# Patient Record
Sex: FEMALE | Race: BLACK OR AFRICAN AMERICAN | NOT HISPANIC OR LATINO | URBAN - METROPOLITAN AREA
[De-identification: names, ages, dates, MRNs, and addresses within clinical notes are randomized per-mention and may not be internally consistent; named-entity substitution may affect disease eponyms.]

---

## 2017-02-27 ENCOUNTER — OUTPATIENT (OUTPATIENT)
Dept: OUTPATIENT SERVICES | Facility: HOSPITAL | Age: 62
LOS: 1 days | Discharge: HOME | End: 2017-02-27

## 2017-02-27 ENCOUNTER — RESULT REVIEW (OUTPATIENT)
Age: 62
End: 2017-02-27

## 2017-02-27 DIAGNOSIS — D64.9 ANEMIA, UNSPECIFIED: ICD-10-CM

## 2017-02-28 ENCOUNTER — APPOINTMENT (OUTPATIENT)
Dept: HEMATOLOGY ONCOLOGY | Facility: CLINIC | Age: 62
End: 2017-02-28

## 2017-02-28 VITALS
SYSTOLIC BLOOD PRESSURE: 105 MMHG | DIASTOLIC BLOOD PRESSURE: 71 MMHG | HEART RATE: 76 BPM | OXYGEN SATURATION: 100 % | WEIGHT: 189.15 LBS | TEMPERATURE: 97.1 F | RESPIRATION RATE: 16 BRPM | BODY MASS INDEX: 34.04 KG/M2

## 2017-02-28 LAB
BASOPHILS # BLD AUTO: 0 K/UL — SIGNIFICANT CHANGE UP (ref 0–0.2)
BASOPHILS NFR BLD AUTO: 0.5 % — SIGNIFICANT CHANGE UP (ref 0–2)
EOSINOPHIL # BLD AUTO: 0.1 K/UL — SIGNIFICANT CHANGE UP (ref 0–0.5)
EOSINOPHIL NFR BLD AUTO: 1.8 % — SIGNIFICANT CHANGE UP (ref 0–6)
HCT VFR BLD CALC: 37.6 % — SIGNIFICANT CHANGE UP (ref 34.5–45)
HGB BLD-MCNC: 13 G/DL — SIGNIFICANT CHANGE UP (ref 11.5–15.5)
LYMPHOCYTES # BLD AUTO: 1.6 K/UL — SIGNIFICANT CHANGE UP (ref 1–3.3)
LYMPHOCYTES # BLD AUTO: 26.1 % — SIGNIFICANT CHANGE UP (ref 13–44)
MCHC RBC-ENTMCNC: 28.6 PG — SIGNIFICANT CHANGE UP (ref 27–34)
MCHC RBC-ENTMCNC: 34.6 G/DL — SIGNIFICANT CHANGE UP (ref 32–36)
MCV RBC AUTO: 82.5 FL — SIGNIFICANT CHANGE UP (ref 80–100)
MONOCYTES # BLD AUTO: 0.6 K/UL — SIGNIFICANT CHANGE UP (ref 0–0.9)
MONOCYTES NFR BLD AUTO: 9.7 % — SIGNIFICANT CHANGE UP (ref 2–14)
NEUTROPHILS # BLD AUTO: 3.9 K/UL — SIGNIFICANT CHANGE UP (ref 1.8–7.4)
NEUTROPHILS NFR BLD AUTO: 62 % — SIGNIFICANT CHANGE UP (ref 43–77)
PLATELET # BLD AUTO: 147 K/UL — LOW (ref 150–400)
RBC # BLD: 4.55 M/UL — SIGNIFICANT CHANGE UP (ref 3.8–5.2)
RBC # FLD: 13.3 % — SIGNIFICANT CHANGE UP (ref 10.3–14.5)
WBC # BLD: 6.2 K/UL — SIGNIFICANT CHANGE UP (ref 3.8–10.5)
WBC # FLD AUTO: 6.2 K/UL — SIGNIFICANT CHANGE UP (ref 3.8–10.5)

## 2017-04-28 ENCOUNTER — RESULT REVIEW (OUTPATIENT)
Age: 62
End: 2017-04-28

## 2017-05-31 ENCOUNTER — RESULT REVIEW (OUTPATIENT)
Age: 62
End: 2017-05-31

## 2017-06-05 ENCOUNTER — APPOINTMENT (OUTPATIENT)
Dept: ALLERGY | Facility: CLINIC | Age: 62
End: 2017-06-05

## 2017-06-05 VITALS
WEIGHT: 180 LBS | DIASTOLIC BLOOD PRESSURE: 80 MMHG | HEART RATE: 80 BPM | RESPIRATION RATE: 16 BRPM | SYSTOLIC BLOOD PRESSURE: 140 MMHG | BODY MASS INDEX: 31.89 KG/M2 | HEIGHT: 63 IN

## 2017-06-05 RX ORDER — METAXALONE 800 MG/1
800 TABLET ORAL
Qty: 21 | Refills: 0 | Status: DISCONTINUED | COMMUNITY
Start: 2017-01-26

## 2017-06-05 RX ORDER — GABAPENTIN 100 MG/1
100 CAPSULE ORAL
Qty: 180 | Refills: 0 | Status: DISCONTINUED | COMMUNITY
Start: 2016-12-28

## 2017-06-05 RX ORDER — CIPROFLOXACIN HYDROCHLORIDE 500 MG/1
500 TABLET, FILM COATED ORAL
Qty: 20 | Refills: 0 | Status: DISCONTINUED | COMMUNITY
Start: 2016-11-02

## 2017-06-07 ENCOUNTER — APPOINTMENT (OUTPATIENT)
Dept: ALLERGY | Facility: CLINIC | Age: 62
End: 2017-06-07

## 2017-06-15 ENCOUNTER — APPOINTMENT (OUTPATIENT)
Dept: ALLERGY | Facility: CLINIC | Age: 62
End: 2017-06-15

## 2017-06-15 VITALS
WEIGHT: 180 LBS | DIASTOLIC BLOOD PRESSURE: 80 MMHG | HEART RATE: 80 BPM | SYSTOLIC BLOOD PRESSURE: 120 MMHG | HEIGHT: 63 IN | BODY MASS INDEX: 31.89 KG/M2 | RESPIRATION RATE: 16 BRPM

## 2017-08-18 ENCOUNTER — OUTPATIENT (OUTPATIENT)
Dept: OUTPATIENT SERVICES | Facility: HOSPITAL | Age: 62
LOS: 1 days | Discharge: HOME | End: 2017-08-18

## 2017-08-18 DIAGNOSIS — D64.9 ANEMIA, UNSPECIFIED: ICD-10-CM

## 2017-08-23 ENCOUNTER — APPOINTMENT (OUTPATIENT)
Dept: HEMATOLOGY ONCOLOGY | Facility: CLINIC | Age: 62
End: 2017-08-23
Payer: COMMERCIAL

## 2017-08-23 ENCOUNTER — RESULT REVIEW (OUTPATIENT)
Age: 62
End: 2017-08-23

## 2017-08-23 VITALS
WEIGHT: 189.58 LBS | OXYGEN SATURATION: 99 % | SYSTOLIC BLOOD PRESSURE: 128 MMHG | BODY MASS INDEX: 33.58 KG/M2 | RESPIRATION RATE: 16 BRPM | TEMPERATURE: 98.7 F | DIASTOLIC BLOOD PRESSURE: 84 MMHG | HEART RATE: 73 BPM

## 2017-08-23 LAB
BASOPHILS # BLD AUTO: 0 K/UL — SIGNIFICANT CHANGE UP (ref 0–0.2)
BASOPHILS NFR BLD AUTO: 0.1 % — SIGNIFICANT CHANGE UP (ref 0–2)
EOSINOPHIL # BLD AUTO: 0.2 K/UL — SIGNIFICANT CHANGE UP (ref 0–0.5)
EOSINOPHIL NFR BLD AUTO: 4.8 % — SIGNIFICANT CHANGE UP (ref 0–6)
HCT VFR BLD CALC: 35.8 % — SIGNIFICANT CHANGE UP (ref 34.5–45)
HGB BLD-MCNC: 12.6 G/DL — SIGNIFICANT CHANGE UP (ref 11.5–15.5)
LYMPHOCYTES # BLD AUTO: 1.6 K/UL — SIGNIFICANT CHANGE UP (ref 1–3.3)
LYMPHOCYTES # BLD AUTO: 36.3 % — SIGNIFICANT CHANGE UP (ref 13–44)
MCHC RBC-ENTMCNC: 28.9 PG — SIGNIFICANT CHANGE UP (ref 27–34)
MCHC RBC-ENTMCNC: 35.2 G/DL — SIGNIFICANT CHANGE UP (ref 32–36)
MCV RBC AUTO: 82.1 FL — SIGNIFICANT CHANGE UP (ref 80–100)
MONOCYTES # BLD AUTO: 0.5 K/UL — SIGNIFICANT CHANGE UP (ref 0–0.9)
MONOCYTES NFR BLD AUTO: 10.7 % — SIGNIFICANT CHANGE UP (ref 2–14)
NEUTROPHILS # BLD AUTO: 2.1 K/UL — SIGNIFICANT CHANGE UP (ref 1.8–7.4)
NEUTROPHILS NFR BLD AUTO: 48.1 % — SIGNIFICANT CHANGE UP (ref 43–77)
PLATELET # BLD AUTO: 136 K/UL — LOW (ref 150–400)
RBC # BLD: 4.36 M/UL — SIGNIFICANT CHANGE UP (ref 3.8–5.2)
RBC # FLD: 13.1 % — SIGNIFICANT CHANGE UP (ref 10.3–14.5)
WBC # BLD: 4.4 K/UL — SIGNIFICANT CHANGE UP (ref 3.8–10.5)
WBC # FLD AUTO: 4.4 K/UL — SIGNIFICANT CHANGE UP (ref 3.8–10.5)

## 2017-08-23 PROCEDURE — 99213 OFFICE O/P EST LOW 20 MIN: CPT

## 2017-08-24 LAB
ALBUMIN SERPL ELPH-MCNC: 3.9 G/DL
ALP BLD-CCNC: 44 U/L
ALT SERPL-CCNC: 11 U/L
ANION GAP SERPL CALC-SCNC: 13 MMOL/L
AST SERPL-CCNC: 17 U/L
BILIRUB SERPL-MCNC: 0.4 MG/DL
BUN SERPL-MCNC: 9 MG/DL
CALCIUM SERPL-MCNC: 8.9 MG/DL
CANCER AG27-29 SERPL-ACNC: 21.2 U/ML
CHLORIDE SERPL-SCNC: 106 MMOL/L
CO2 SERPL-SCNC: 24 MMOL/L
CREAT SERPL-MCNC: 0.78 MG/DL
GLUCOSE SERPL-MCNC: 89 MG/DL
LDH SERPL-CCNC: 196 U/L
POTASSIUM SERPL-SCNC: 3.8 MMOL/L
PROT SERPL-MCNC: 7.5 G/DL
SODIUM SERPL-SCNC: 143 MMOL/L

## 2017-11-27 ENCOUNTER — APPOINTMENT (OUTPATIENT)
Dept: CT IMAGING | Facility: IMAGING CENTER | Age: 62
End: 2017-11-27
Payer: COMMERCIAL

## 2017-11-27 ENCOUNTER — OUTPATIENT (OUTPATIENT)
Dept: OUTPATIENT SERVICES | Facility: HOSPITAL | Age: 62
LOS: 1 days | End: 2017-11-27
Payer: COMMERCIAL

## 2017-11-27 DIAGNOSIS — Z00.8 ENCOUNTER FOR OTHER GENERAL EXAMINATION: ICD-10-CM

## 2017-11-27 PROCEDURE — 70486 CT MAXILLOFACIAL W/O DYE: CPT

## 2017-11-27 PROCEDURE — 70486 CT MAXILLOFACIAL W/O DYE: CPT | Mod: 26

## 2018-02-21 ENCOUNTER — OUTPATIENT (OUTPATIENT)
Dept: OUTPATIENT SERVICES | Facility: HOSPITAL | Age: 63
LOS: 1 days | Discharge: HOME | End: 2018-02-21

## 2018-02-21 DIAGNOSIS — D64.9 ANEMIA, UNSPECIFIED: ICD-10-CM

## 2018-02-27 ENCOUNTER — APPOINTMENT (OUTPATIENT)
Dept: HEMATOLOGY ONCOLOGY | Facility: CLINIC | Age: 63
End: 2018-02-27

## 2018-03-28 ENCOUNTER — APPOINTMENT (OUTPATIENT)
Dept: ALLERGY | Facility: CLINIC | Age: 63
End: 2018-03-28
Payer: COMMERCIAL

## 2018-03-28 VITALS
RESPIRATION RATE: 14 BRPM | HEIGHT: 63 IN | SYSTOLIC BLOOD PRESSURE: 130 MMHG | WEIGHT: 180 LBS | HEART RATE: 72 BPM | BODY MASS INDEX: 31.89 KG/M2 | DIASTOLIC BLOOD PRESSURE: 80 MMHG

## 2018-03-28 PROCEDURE — 99214 OFFICE O/P EST MOD 30 MIN: CPT

## 2018-03-28 RX ORDER — PREDNISONE 20 MG/1
20 TABLET ORAL
Qty: 6 | Refills: 0 | Status: DISCONTINUED | COMMUNITY
Start: 2018-03-05

## 2018-03-28 RX ORDER — PREDNISONE 10 MG/1
10 TABLET ORAL
Qty: 15 | Refills: 0 | Status: DISCONTINUED | COMMUNITY
Start: 2017-06-05 | End: 2018-03-28

## 2018-03-28 RX ORDER — BENZONATATE 200 MG/1
200 CAPSULE ORAL
Qty: 30 | Refills: 0 | Status: DISCONTINUED | COMMUNITY
Start: 2018-03-05

## 2018-04-12 ENCOUNTER — OUTPATIENT (OUTPATIENT)
Dept: OUTPATIENT SERVICES | Facility: HOSPITAL | Age: 63
LOS: 1 days | Discharge: HOME | End: 2018-04-12

## 2018-04-12 DIAGNOSIS — D64.9 ANEMIA, UNSPECIFIED: ICD-10-CM

## 2018-04-17 ENCOUNTER — LABORATORY RESULT (OUTPATIENT)
Age: 63
End: 2018-04-17

## 2018-04-17 ENCOUNTER — RESULT REVIEW (OUTPATIENT)
Age: 63
End: 2018-04-17

## 2018-04-17 ENCOUNTER — APPOINTMENT (OUTPATIENT)
Dept: HEMATOLOGY ONCOLOGY | Facility: CLINIC | Age: 63
End: 2018-04-17
Payer: COMMERCIAL

## 2018-04-17 VITALS
TEMPERATURE: 98.4 F | RESPIRATION RATE: 16 BRPM | SYSTOLIC BLOOD PRESSURE: 138 MMHG | HEART RATE: 97 BPM | WEIGHT: 195.11 LBS | BODY MASS INDEX: 34.56 KG/M2 | DIASTOLIC BLOOD PRESSURE: 87 MMHG | OXYGEN SATURATION: 98 %

## 2018-04-17 DIAGNOSIS — R25.2 CRAMP AND SPASM: ICD-10-CM

## 2018-04-17 LAB
BASOPHILS # BLD AUTO: 0 K/UL — SIGNIFICANT CHANGE UP (ref 0–0.2)
BASOPHILS NFR BLD AUTO: 0.8 % — SIGNIFICANT CHANGE UP (ref 0–2)
EOSINOPHIL # BLD AUTO: 0.1 K/UL — SIGNIFICANT CHANGE UP (ref 0–0.5)
EOSINOPHIL NFR BLD AUTO: 2.4 % — SIGNIFICANT CHANGE UP (ref 0–6)
HCT VFR BLD CALC: 37.6 % — SIGNIFICANT CHANGE UP (ref 34.5–45)
HGB BLD-MCNC: 13.1 G/DL — SIGNIFICANT CHANGE UP (ref 11.5–15.5)
LYMPHOCYTES # BLD AUTO: 1.6 K/UL — SIGNIFICANT CHANGE UP (ref 1–3.3)
LYMPHOCYTES # BLD AUTO: 26.9 % — SIGNIFICANT CHANGE UP (ref 13–44)
MCHC RBC-ENTMCNC: 28.7 PG — SIGNIFICANT CHANGE UP (ref 27–34)
MCHC RBC-ENTMCNC: 34.8 G/DL — SIGNIFICANT CHANGE UP (ref 32–36)
MCV RBC AUTO: 82.6 FL — SIGNIFICANT CHANGE UP (ref 80–100)
MONOCYTES # BLD AUTO: 0.5 K/UL — SIGNIFICANT CHANGE UP (ref 0–0.9)
MONOCYTES NFR BLD AUTO: 9.4 % — SIGNIFICANT CHANGE UP (ref 2–14)
NEUTROPHILS # BLD AUTO: 3.5 K/UL — SIGNIFICANT CHANGE UP (ref 1.8–7.4)
NEUTROPHILS NFR BLD AUTO: 60.5 % — SIGNIFICANT CHANGE UP (ref 43–77)
PLATELET # BLD AUTO: 139 K/UL — LOW (ref 150–400)
RBC # BLD: 4.56 M/UL — SIGNIFICANT CHANGE UP (ref 3.8–5.2)
RBC # FLD: 13.6 % — SIGNIFICANT CHANGE UP (ref 10.3–14.5)
WBC # BLD: 5.8 K/UL — SIGNIFICANT CHANGE UP (ref 3.8–10.5)
WBC # FLD AUTO: 5.8 K/UL — SIGNIFICANT CHANGE UP (ref 3.8–10.5)

## 2018-04-17 PROCEDURE — 99214 OFFICE O/P EST MOD 30 MIN: CPT

## 2019-02-26 ENCOUNTER — OUTPATIENT (OUTPATIENT)
Dept: OUTPATIENT SERVICES | Facility: HOSPITAL | Age: 64
LOS: 1 days | Discharge: HOME | End: 2019-02-26

## 2019-02-26 DIAGNOSIS — D64.9 ANEMIA, UNSPECIFIED: ICD-10-CM

## 2019-03-07 ENCOUNTER — APPOINTMENT (OUTPATIENT)
Dept: HEMATOLOGY ONCOLOGY | Facility: CLINIC | Age: 64
End: 2019-03-07
Payer: COMMERCIAL

## 2019-03-14 ENCOUNTER — APPOINTMENT (OUTPATIENT)
Dept: HEMATOLOGY ONCOLOGY | Facility: CLINIC | Age: 64
End: 2019-03-14
Payer: COMMERCIAL

## 2019-03-14 ENCOUNTER — RESULT REVIEW (OUTPATIENT)
Age: 64
End: 2019-03-14

## 2019-03-14 VITALS
RESPIRATION RATE: 18 BRPM | TEMPERATURE: 98.8 F | OXYGEN SATURATION: 100 % | BODY MASS INDEX: 35.54 KG/M2 | WEIGHT: 200.62 LBS | SYSTOLIC BLOOD PRESSURE: 123 MMHG | DIASTOLIC BLOOD PRESSURE: 80 MMHG | HEART RATE: 69 BPM

## 2019-03-14 DIAGNOSIS — Z63.0 PROBLEMS IN RELATIONSHIP WITH SPOUSE OR PARTNER: ICD-10-CM

## 2019-03-14 LAB
BASOPHILS # BLD AUTO: 0 K/UL — SIGNIFICANT CHANGE UP (ref 0–0.2)
BASOPHILS NFR BLD AUTO: 0.7 % — SIGNIFICANT CHANGE UP (ref 0–2)
EOSINOPHIL # BLD AUTO: 0.2 K/UL — SIGNIFICANT CHANGE UP (ref 0–0.5)
EOSINOPHIL NFR BLD AUTO: 3.6 % — SIGNIFICANT CHANGE UP (ref 0–6)
HCT VFR BLD CALC: 35.3 % — SIGNIFICANT CHANGE UP (ref 34.5–45)
HGB BLD-MCNC: 12.6 G/DL — SIGNIFICANT CHANGE UP (ref 11.5–15.5)
LYMPHOCYTES # BLD AUTO: 1.5 K/UL — SIGNIFICANT CHANGE UP (ref 1–3.3)
LYMPHOCYTES # BLD AUTO: 31.1 % — SIGNIFICANT CHANGE UP (ref 13–44)
MCHC RBC-ENTMCNC: 28.7 PG — SIGNIFICANT CHANGE UP (ref 27–34)
MCHC RBC-ENTMCNC: 35.8 G/DL — SIGNIFICANT CHANGE UP (ref 32–36)
MCV RBC AUTO: 80.1 FL — SIGNIFICANT CHANGE UP (ref 80–100)
MONOCYTES # BLD AUTO: 0.4 K/UL — SIGNIFICANT CHANGE UP (ref 0–0.9)
MONOCYTES NFR BLD AUTO: 8.9 % — SIGNIFICANT CHANGE UP (ref 2–14)
NEUTROPHILS # BLD AUTO: 2.6 K/UL — SIGNIFICANT CHANGE UP (ref 1.8–7.4)
NEUTROPHILS NFR BLD AUTO: 55.7 % — SIGNIFICANT CHANGE UP (ref 43–77)
PLATELET # BLD AUTO: 161 K/UL — SIGNIFICANT CHANGE UP (ref 150–400)
RBC # BLD: 4.41 M/UL — SIGNIFICANT CHANGE UP (ref 3.8–5.2)
RBC # FLD: 13 % — SIGNIFICANT CHANGE UP (ref 10.3–14.5)
WBC # BLD: 4.7 K/UL — SIGNIFICANT CHANGE UP (ref 3.8–10.5)
WBC # FLD AUTO: 4.7 K/UL — SIGNIFICANT CHANGE UP (ref 3.8–10.5)

## 2019-03-14 PROCEDURE — 99215 OFFICE O/P EST HI 40 MIN: CPT

## 2019-03-14 SDOH — SOCIAL STABILITY - SOCIAL INSECURITY: PROBLEMS IN RELATIONSHIP WITH SPOUSE OR PARTNER: Z63.0
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
## 2019-03-14 NOTE — ASSESSMENT
[FreeTextEntry1] : NO further breast events\par \par \par Plan referral Akash Doshi for headaches\par Recommended therapy

## 2019-03-15 LAB
ALBUMIN SERPL ELPH-MCNC: 4.2 G/DL
ALP BLD-CCNC: 59 U/L
ALT SERPL-CCNC: 51 U/L
ANION GAP SERPL CALC-SCNC: 10 MMOL/L
AST SERPL-CCNC: 40 U/L
BILIRUB SERPL-MCNC: 0.4 MG/DL
BUN SERPL-MCNC: 8 MG/DL
CALCIUM SERPL-MCNC: 9.5 MG/DL
CANCER AG27-29 SERPL-ACNC: 27.3 U/ML
CHLORIDE SERPL-SCNC: 106 MMOL/L
CO2 SERPL-SCNC: 26 MMOL/L
CREAT SERPL-MCNC: 0.79 MG/DL
GLUCOSE SERPL-MCNC: 99 MG/DL
LDH SERPL-CCNC: 206 U/L
POTASSIUM SERPL-SCNC: 3.9 MMOL/L
PROT SERPL-MCNC: 7.3 G/DL
SODIUM SERPL-SCNC: 142 MMOL/L

## 2019-03-25 ENCOUNTER — RESULT REVIEW (OUTPATIENT)
Age: 64
End: 2019-03-25

## 2019-05-21 ENCOUNTER — OTHER (OUTPATIENT)
Age: 64
End: 2019-05-21

## 2019-08-02 ENCOUNTER — APPOINTMENT (OUTPATIENT)
Dept: GASTROENTEROLOGY | Facility: CLINIC | Age: 64
End: 2019-08-02
Payer: COMMERCIAL

## 2019-08-02 VITALS
SYSTOLIC BLOOD PRESSURE: 132 MMHG | RESPIRATION RATE: 16 BRPM | BODY MASS INDEX: 32.78 KG/M2 | TEMPERATURE: 97.6 F | DIASTOLIC BLOOD PRESSURE: 72 MMHG | WEIGHT: 185 LBS | HEART RATE: 88 BPM | HEIGHT: 63 IN | OXYGEN SATURATION: 98 %

## 2019-08-02 DIAGNOSIS — Z12.11 ENCOUNTER FOR SCREENING FOR MALIGNANT NEOPLASM OF COLON: ICD-10-CM

## 2019-08-02 PROCEDURE — 99204 OFFICE O/P NEW MOD 45 MIN: CPT

## 2019-08-02 RX ORDER — POLYETHYLENE GLYCOL 3350, SODIUM SULFATE, SODIUM CHLORIDE, POTASSIUM CHLORIDE, ASCORBIC ACID, SODIUM ASCORBATE 7.5-2.691G
100 KIT ORAL
Qty: 1 | Refills: 0 | Status: DISCONTINUED | COMMUNITY
Start: 2019-05-21 | End: 2019-08-02

## 2019-08-02 RX ORDER — FLUTICASONE PROPIONATE AND SALMETEROL 50; 500 UG/1; UG/1
500-50 POWDER RESPIRATORY (INHALATION) TWICE DAILY
Qty: 1 | Refills: 1 | Status: DISCONTINUED | COMMUNITY
Start: 2017-06-05 | End: 2019-08-02

## 2019-08-02 NOTE — HISTORY OF PRESENT ILLNESS
[FreeTextEntry1] : Office consultation 8/2/19.\par \par The patient is a 63-year-old woman evaluated for consultation in preparation for a surveillance colonoscopy. PMD: Dr. Joie Marie.\par \par The patient has one to 2 formed bowel movements daily without any complaints of diarrhea, constipation, change in bowel habit or rectal bleeding.\par \par The patient has a long history of "indigestion" of many years duration. This has been episodic over the years. More recently, this has been so less trouble than in the past. Complaint of indigestion is described as heartburn, upper abdominal bloating, gaseous feeling and feeling of need to belch. Gets relief with Pepcid. Although describes feeling full easily she is able to have a regular full-sized meal. These episodes of indigestion can occur approximately one to 2 times per month. Episodically over the years she has been on various antisecretory therapies. For the past 6 months she has been on pantoprazole 40 mg q.d. with symptomatic improvement.\par \par Appetite is stable. Patient may have lost several pounds but this was intentional. No significant dysphagia. Occasionally can feel sense of retrosternal bolus hangup. Denies nausea or vomiting. As noted, can experience heartburn with the indigestion episodes.\par \par  The patient underwent a surveillance colonoscopy on 8/18/14. Total colonoscopy was performed to the cecum with ileoscopy. A 3 millimeter ascending colon adenoma, a 3 mm proximal transverse colon adenoma and a 1 mm rectal hyperplastic polyp were removed. Anal skin tags were noted. Mild sigmoid diverticulosis was detected. \par \par The patient also underwent an upper endoscopy to evaluate dyspepsia. The esophagus, stomach and duodenum were macroscopically normal. An antral biopsy revealed chronic inactive gastritis. Testing for Helicobacter pylori was negative.\par \par The patient has a history of gallstones. She previously underwent an open cholecystectomy in the 1990s. The patient reports no other history of digestive illness. Family history of colon cancer is not reported.\par

## 2019-08-02 NOTE — ASSESSMENT
[FreeTextEntry1] : Impression:\par \par #1 history of colonic adenomatous polyps-colonoscopy in 8/18/14 noted for removal of a 3 mm ascending colon adenoma and 3 mm transverse colon adenoma. No current lower GI symptoms. Rule out metachronous lesions.\par \par #2 GERD-chronic symptoms of heartburn. EGD on 8/18/14 noted for normal esophagus without detection of erosive esophagitis, Johnson's esophagus or hiatus hernia.\par \par #3 dyspepsia-chronic intermittent indigestion symptoms. Suspect mostly GERD-related. Component of functional dyspepsia contributory. EGD on 8/18/14 noted for normal stomach. Gastric antrum biopsy revealed chronic inactive gastritis. Testing was negative for Helicobacter pylori.\par \par #4 obesity.\par \par #5 left breast cancer 2011-status post lumpectomy, chemotherapy and radiation therapy.\par \par #6 sickle cell trait.\par \par #7 history of gallstones-status post open cholecystectomy 1992.\par \par #8 status post episode of hematuria.\par \par #9 asthma

## 2019-08-02 NOTE — REVIEW OF SYSTEMS
[As Noted in HPI] : as noted in HPI [Joint Pain] : joint pain [Negative] : Heme/Lymph [FreeTextEntry9] : Neck pain.

## 2019-08-02 NOTE — REASON FOR VISIT
[Consultation] : a consultation visit [FreeTextEntry1] : in preparation for a surveillance colonoscopy.

## 2019-08-02 NOTE — CONSULT LETTER
[Dear  ___] : Dear  [unfilled], [( Thank you for referring [unfilled] for consultation for _____ )] : Thank you for referring [unfilled] for consultation for [unfilled] [Consult Letter:] : I had the pleasure of evaluating your patient, [unfilled]. [Please see my note below.] : Please see my note below. [Consult Closing:] : Thank you very much for allowing me to participate in the care of this patient.  If you have any questions, please do not hesitate to contact me. [Sincerely,] : Sincerely, [FreeTextEntry2] : Dr. Joie Morales [FreeTextEntry3] : Sundeep Carter M.D.

## 2019-08-19 ENCOUNTER — APPOINTMENT (OUTPATIENT)
Dept: GASTROENTEROLOGY | Facility: HOSPITAL | Age: 64
End: 2019-08-19

## 2019-08-19 ENCOUNTER — OUTPATIENT (OUTPATIENT)
Dept: OUTPATIENT SERVICES | Facility: HOSPITAL | Age: 64
LOS: 1 days | Discharge: HOME | End: 2019-08-19
Payer: COMMERCIAL

## 2019-08-19 DIAGNOSIS — Z12.11 ENCOUNTER FOR SCREENING FOR MALIGNANT NEOPLASM OF COLON: ICD-10-CM

## 2019-08-19 PROCEDURE — 45378 DIAGNOSTIC COLONOSCOPY: CPT

## 2019-09-17 ENCOUNTER — OUTPATIENT (OUTPATIENT)
Dept: OUTPATIENT SERVICES | Facility: HOSPITAL | Age: 64
LOS: 1 days | Discharge: HOME | End: 2019-09-17

## 2019-09-17 ENCOUNTER — APPOINTMENT (OUTPATIENT)
Dept: HEMATOLOGY ONCOLOGY | Facility: CLINIC | Age: 64
End: 2019-09-17

## 2019-09-17 DIAGNOSIS — C50.919 MALIGNANT NEOPLASM OF UNSPECIFIED SITE OF UNSPECIFIED FEMALE BREAST: ICD-10-CM

## 2019-09-17 DIAGNOSIS — D64.9 ANEMIA, UNSPECIFIED: ICD-10-CM

## 2019-09-18 ENCOUNTER — RESULT REVIEW (OUTPATIENT)
Age: 64
End: 2019-09-18

## 2019-09-18 ENCOUNTER — APPOINTMENT (OUTPATIENT)
Dept: HEMATOLOGY ONCOLOGY | Facility: CLINIC | Age: 64
End: 2019-09-18
Payer: COMMERCIAL

## 2019-09-18 VITALS
WEIGHT: 200.18 LBS | HEART RATE: 69 BPM | BODY MASS INDEX: 35.46 KG/M2 | TEMPERATURE: 98.8 F | DIASTOLIC BLOOD PRESSURE: 86 MMHG | RESPIRATION RATE: 18 BRPM | OXYGEN SATURATION: 100 % | SYSTOLIC BLOOD PRESSURE: 123 MMHG

## 2019-09-18 DIAGNOSIS — C50.919 MALIGNANT NEOPLASM OF UNSPECIFIED SITE OF UNSPECIFIED FEMALE BREAST: ICD-10-CM

## 2019-09-18 LAB
BASOPHILS # BLD AUTO: 0 K/UL — SIGNIFICANT CHANGE UP (ref 0–0.2)
BASOPHILS NFR BLD AUTO: 0.5 % — SIGNIFICANT CHANGE UP (ref 0–2)
EOSINOPHIL # BLD AUTO: 0.2 K/UL — SIGNIFICANT CHANGE UP (ref 0–0.5)
EOSINOPHIL NFR BLD AUTO: 3.5 % — SIGNIFICANT CHANGE UP (ref 0–6)
HCT VFR BLD CALC: 37.6 % — SIGNIFICANT CHANGE UP (ref 34.5–45)
HGB BLD-MCNC: 12.1 G/DL — SIGNIFICANT CHANGE UP (ref 11.5–15.5)
LYMPHOCYTES # BLD AUTO: 2.1 K/UL — SIGNIFICANT CHANGE UP (ref 1–3.3)
LYMPHOCYTES # BLD AUTO: 35.5 % — SIGNIFICANT CHANGE UP (ref 13–44)
MCHC RBC-ENTMCNC: 26.5 PG — LOW (ref 27–34)
MCHC RBC-ENTMCNC: 32.1 G/DL — SIGNIFICANT CHANGE UP (ref 32–36)
MCV RBC AUTO: 82.4 FL — SIGNIFICANT CHANGE UP (ref 80–100)
MONOCYTES # BLD AUTO: 0.6 K/UL — SIGNIFICANT CHANGE UP (ref 0–0.9)
MONOCYTES NFR BLD AUTO: 9.3 % — SIGNIFICANT CHANGE UP (ref 2–14)
NEUTROPHILS # BLD AUTO: 3 K/UL — SIGNIFICANT CHANGE UP (ref 1.8–7.4)
NEUTROPHILS NFR BLD AUTO: 51.2 % — SIGNIFICANT CHANGE UP (ref 43–77)
PLATELET # BLD AUTO: 162 K/UL — SIGNIFICANT CHANGE UP (ref 150–400)
RBC # BLD: 4.56 M/UL — SIGNIFICANT CHANGE UP (ref 3.8–5.2)
RBC # FLD: 13.4 % — SIGNIFICANT CHANGE UP (ref 10.3–14.5)
WBC # BLD: 5.9 K/UL — SIGNIFICANT CHANGE UP (ref 3.8–10.5)
WBC # FLD AUTO: 5.9 K/UL — SIGNIFICANT CHANGE UP (ref 3.8–10.5)

## 2019-09-18 PROCEDURE — 99215 OFFICE O/P EST HI 40 MIN: CPT

## 2019-09-18 RX ORDER — ALBUTEROL SULFATE 90 UG/1
108 (90 BASE) AEROSOL, METERED RESPIRATORY (INHALATION)
Qty: 1 | Refills: 2 | Status: DISCONTINUED | COMMUNITY
Start: 2018-03-28 | End: 2019-09-18

## 2019-09-18 RX ORDER — EPINEPHRINE 0.3 MG/.3ML
0.3 INJECTION INTRAMUSCULAR
Qty: 2 | Refills: 0 | Status: DISCONTINUED | COMMUNITY
Start: 2018-03-28 | End: 2019-09-18

## 2019-09-18 RX ORDER — AMOXICILLIN AND CLAVULANATE POTASSIUM 875; 125 MG/1; MG/1
875-125 TABLET, COATED ORAL
Qty: 20 | Refills: 0 | Status: DISCONTINUED | COMMUNITY
Start: 2018-03-28 | End: 2019-09-18

## 2019-09-18 RX ORDER — PREDNISONE 10 MG/1
10 TABLET ORAL
Qty: 60 | Refills: 0 | Status: DISCONTINUED | COMMUNITY
Start: 2018-03-28 | End: 2019-09-18

## 2019-09-18 NOTE — CONSULT LETTER
[Dear  ___] : Dear  [unfilled], [Consult Letter:] : I had the pleasure of evaluating your patient, [unfilled]. [Consult Closing:] : Thank you very much for allowing me to participate in the care of this patient.  If you have any questions, please do not hesitate to contact me. [Please see my note below.] : Please see my note below. [Sincerely,] : Sincerely, [FreeTextEntry3] : Casi Madera MD\par Division of Medical Oncology and Hematology\par [facility] [place]\par [employer] [facility]\par [place], [place]\par \par

## 2019-09-18 NOTE — HISTORY OF PRESENT ILLNESS
[de-identified] : Ms. Aguilar is a 60-year-old  woman was diagnosed with T1cN0 triple negative breast cancer left breast in July 2011. She received ACT chemotherapy and was accrued on NSABP B. 46 and received bevacizumab which was stopped because of the development of severe arthralgias and eosinophilia; she has remained in remission.  [de-identified] : Ms. JAZMÍN RENAE  is here for a follow up appt. She had TNBC in , s/p chemo and RT. \par She has no new complaints. Headaches are better. Her appetite is good, wt same, energy good. She denies bone pain, no cp, no SOB. \par No neuropathy from chemo, no lymphedema\par Breast imagin2019 (Dr Arriaga) NRAD\par BRCA 2015 - negative \par She works as an  in [place]\par FIRST VISIT WITH ME, TX OF CARE FROM DR GUERIN. CHART REVIEWD

## 2019-09-18 NOTE — PHYSICAL EXAM
[Fully active, able to carry on all pre-disease performance without restriction] : Status 0 - Fully active, able to carry on all pre-disease performance without restriction [Normal] : bilateral breasts without nipple retraction, skin dimpling or palpable masses; the bilateral axillae are without adenopathy [de-identified] : HEALED LEFT BREAST LUMPECTOMY SCAR

## 2019-09-18 NOTE — ASSESSMENT
[FreeTextEntry1] : \par Ms. Aguilar is a 64-year-old  woman was diagnosed with T1cN0 triple negative breast cancer left breast in July 2011. She received ACT chemotherapy and was accrued on NSABP B. 46 and received bevacizumab which was stopped because of the development of severe arthralgias and eosinophilia; she has remained in remission. BRCA NEGATIVE\par - She is doing well. Clinically AUTUMN. No residual sequelae from chemotherapy or surgery. She is up to with mammogram. Encourage healthy diet and exercise. Continue followup with primary care. I recommend age-appropriate malignancy screening. She is more than 5 yrs out from TNBC dx therefore risk of recurrence is minimal to none. Educated about s/sx of recurrence. Referred to survivorship clinic. \par \par RTC 1 y

## 2019-09-18 NOTE — REASON FOR VISIT
[Follow-Up Visit] : a follow-up [Other: _____] : [unfilled] [FreeTextEntry2] : Triple negative Breast cancer

## 2019-09-19 LAB
ALBUMIN SERPL ELPH-MCNC: 4.1 G/DL
ALP BLD-CCNC: 60 U/L
ALT SERPL-CCNC: 14 U/L
ANION GAP SERPL CALC-SCNC: 9 MMOL/L
AST SERPL-CCNC: 18 U/L
BILIRUB SERPL-MCNC: 0.2 MG/DL
BUN SERPL-MCNC: 13 MG/DL
CALCIUM SERPL-MCNC: 9.4 MG/DL
CHLORIDE SERPL-SCNC: 106 MMOL/L
CO2 SERPL-SCNC: 26 MMOL/L
CREAT SERPL-MCNC: 0.87 MG/DL
GLUCOSE SERPL-MCNC: 93 MG/DL
POTASSIUM SERPL-SCNC: 4.5 MMOL/L
PROT SERPL-MCNC: 7.4 G/DL
SODIUM SERPL-SCNC: 141 MMOL/L

## 2019-12-01 ENCOUNTER — TRANSCRIPTION ENCOUNTER (OUTPATIENT)
Age: 64
End: 2019-12-01

## 2020-04-02 ENCOUNTER — APPOINTMENT (OUTPATIENT)
Dept: ALLERGY | Facility: CLINIC | Age: 65
End: 2020-04-02
Payer: COMMERCIAL

## 2020-04-02 VITALS
SYSTOLIC BLOOD PRESSURE: 120 MMHG | HEART RATE: 115 BPM | HEIGHT: 63 IN | BODY MASS INDEX: 31.89 KG/M2 | DIASTOLIC BLOOD PRESSURE: 82 MMHG | RESPIRATION RATE: 16 BRPM | OXYGEN SATURATION: 98 % | WEIGHT: 180 LBS

## 2020-04-02 PROCEDURE — 99214 OFFICE O/P EST MOD 30 MIN: CPT

## 2020-04-02 RX ORDER — MONTELUKAST 10 MG/1
10 TABLET, FILM COATED ORAL DAILY
Qty: 90 | Refills: 3 | Status: ACTIVE | COMMUNITY
Start: 1900-01-01 | End: 1900-01-01

## 2020-04-02 RX ORDER — PANTOPRAZOLE SODIUM 40 MG/1
40 TABLET, DELAYED RELEASE ORAL
Refills: 0 | Status: ACTIVE | COMMUNITY

## 2020-04-02 NOTE — PHYSICAL EXAM
[Alert] : alert [Well Nourished] : well nourished [Healthy Appearance] : healthy appearance [No Acute Distress] : no acute distress [Well Developed] : well developed [Normal Voice/Communication] : normal voice communication [Conjunctival Erythema] : no conjunctival erythema [Suborbital Bogginess] : no suborbital bogginess (allergic shiners) [Normal Lips/Tongue] : the lips and tongue were normal [Normal Tonsils] : normal tonsils [No Oral Lesions or Ulcers] : no oral lesions or ulcers [No Neck Mass] : no neck mass was observed [No LAD] : no lymphadenopathy [No Crackles] : no crackles [No Retractions] : no retractions [Bilateral Audible Breath Sounds] : bilateral audible breath sounds [Wheezing] : wheezing was heard [Normal Rate] : heart rate was normal  [Normal S1, S2] : normal S1 and S2 [No murmur] : no murmur [Regular Rhythm] : with a regular rhythm [Normal Cervical Lymph Nodes] : cervical [Eczematous Patches] : no eczematous patches [Xerosis] : no xerosis [No clubbing] : no clubbing [No Edema] : no edema [No Cyanosis] : no cyanosis [Normal Mood] : mood was normal [Normal Affect] : affect was normal [Judgment and Insight Age Appropriate] : judgement and insight is age appropriate [Alert, Awake, Oriented as Age-Appropriate] : alert, awake, oriented as age appropriate [de-identified] : scattered expiratory wheezing

## 2020-04-02 NOTE — ASSESSMENT
[facility]  Brief Operative Note     SUMMARY     Surgery Date: 3/2/2018     Surgeon(s) and Role:     * Larry Dutta Jr., MD - Primary    Assisting Surgeon: None    Pre-op Diagnosis:  Closed fracture of right hand with routine healing [S62.91XD]    Post-op Diagnosis:  Post-Op Diagnosis Codes:     * Closed fracture of right hand with routine healing [S62.91XD]    Procedure(s) (LRB):  OPEN REDUCTION INTERNAL FIXATION-METACARPALS (Right)    Anesthesia: Regional    Description of the findings of the procedure: right 5th met fx    Findings/Key Components: CRPP right hand fx    Estimated Blood Loss: * No values recorded between 3/2/2018 11:31 AM and 3/2/2018 12:00 PM *         Specimens:   Specimen (12h ago through future)    None          Discharge Note    SUMMARY     Admit Date: 3/2/2018    Discharge Date and Time:  03/02/2018 12:00 PM    Hospital Course (synopsis of major diagnoses, care, treatment, and services provided during the course of the hospital stay): see op note     Final Diagnosis: Post-Op Diagnosis Codes:     * Closed fracture of right hand with routine healing [S62.91XD]    Disposition: Home or Self Care    Follow Up/Patient Instructions:     Medications:  Reconciled Home Medications:   Current Discharge Medication List      START taking these medications    Details   hydrocodone-acetaminophen 7.5-325mg (NORCO) 7.5-325 mg per tablet Take 1 tablet by mouth every 4 (four) hours as needed.  Qty: 40 tablet, Refills: 0         CONTINUE these medications which have NOT CHANGED    Details   oxyCODONE-acetaminophen (PERCOCET) 5-325 mg per tablet Take 1 tablet by mouth every 4 (four) hours as needed for Pain.  Qty: 15 tablet, Refills: 0      oxyCODONE-acetaminophen (PERCOCET) 7.5-325 mg per tablet Take 1 tablet by mouth every 4 (four) hours as needed for Pain.  Qty: 40 tablet, Refills: 0             Discharge Procedure Orders  Diet general     Shower on day dressing removed (No bath)     Call  [FreeTextEntry1] : Mild persistent asthma:\par \par Singulair QD\par Proair 2 puffs QID prn  MD for:  temperature >100.4     Call MD for:  persistent nausea and vomiting     Call MD for:  severe uncontrolled pain     Leave dressing on - Keep it clean, dry, and intact until clinic visit     Keep surgical extremity elevated       Follow-up Information     Larry Dutta Jr, MD. Schedule an appointment as soon as possible for a visit in 2 weeks.    Specialties:  Hand Surgery, Orthopedic Surgery  Why:  For wound re-check  Contact information:  [address]  [address]  [facility] [address] [phone]

## 2020-04-02 NOTE — HISTORY OF PRESENT ILLNESS
[de-identified] : Patient has been taking Singulair and Proair prn - this week she started her rescue inhaler BID - prior to this week seldom use - less than 1 x per month.   She has run out of Singulair.

## 2020-04-02 NOTE — REVIEW OF SYSTEMS
[Heartburn] : heartburn [Nl] : Genitourinary [FreeTextEntry7] : takes prn Zantac/Pepcid [de-identified] : remission of breast cancer

## 2020-04-14 ENCOUNTER — APPOINTMENT (OUTPATIENT)
Dept: ALLERGY | Facility: CLINIC | Age: 65
End: 2020-04-14
Payer: COMMERCIAL

## 2020-04-14 PROCEDURE — 99213 OFFICE O/P EST LOW 20 MIN: CPT | Mod: 95

## 2020-04-14 RX ORDER — FLUTICASONE PROPIONATE AND SALMETEROL 250; 50 UG/1; UG/1
250-50 POWDER RESPIRATORY (INHALATION)
Qty: 1 | Refills: 1 | Status: ACTIVE | COMMUNITY
Start: 2020-04-14 | End: 1900-01-01

## 2020-04-14 NOTE — PHYSICAL EXAM
[Alert] : alert [Well Nourished] : well nourished [Healthy Appearance] : healthy appearance [No Acute Distress] : no acute distress [Well Developed] : well developed [Normal Voice/Communication] : normal voice communication [Normal Mood] : mood was normal [Normal Affect] : affect was normal [Judgment and Insight Age Appropriate] : judgement and insight is age appropriate [Alert, Awake, Oriented as Age-Appropriate] : alert, awake, oriented as age appropriate

## 2020-04-14 NOTE — HISTORY OF PRESENT ILLNESS
[Home] : at home, [unfilled] , at the time of the visit. [Medical Office: ([facility])___] : at ~his/her~ medical office located in [place] [Patient] : the patient [Self] : self [FreeTextEntry2] : Natasha Aguilar  [de-identified] : Patient is presently taking Singulair and Advair 250/50 BID and using her rescue inhaler QD - she feels much better with this medical therapy.    She has had minimal cough/wheeze with this change in treatment.   No other systemic symptoms.

## 2020-05-26 ENCOUNTER — APPOINTMENT (OUTPATIENT)
Dept: ALLERGY | Facility: CLINIC | Age: 65
End: 2020-05-26
Payer: COMMERCIAL

## 2020-05-26 DIAGNOSIS — Z11.59 ENCOUNTER FOR SCREENING FOR OTHER VIRAL DISEASES: ICD-10-CM

## 2020-05-26 PROCEDURE — 99213 OFFICE O/P EST LOW 20 MIN: CPT | Mod: 95

## 2020-05-26 NOTE — PHYSICAL EXAM
[Well Nourished] : well nourished [Healthy Appearance] : healthy appearance [Alert] : alert [Well Developed] : well developed [No Acute Distress] : no acute distress [Normal Voice/Communication] : normal voice communication [Wheezing] : no wheezing was heard [Normal Mood] : mood was normal [Normal Affect] : affect was normal [Alert, Awake, Oriented as Age-Appropriate] : alert, awake, oriented as age appropriate [de-identified] : no audible wheezing  [Judgment and Insight Age Appropriate] : judgement and insight is age appropriate

## 2020-05-26 NOTE — ASSESSMENT
[FreeTextEntry1] : Moderate persistent asthma well controlled:\par \par Continue Advair 250/50 BID\par Continue Singulair QD\par Continue Proventil 2 puffs QID prn \par \par Allergic rhinitis:\par \par Azelastine BID\par \par Obtain COVID antibodies\par \par Telehealth in 1 month\par \par This visit was provided via telehealth using real time 2-way audio visual technology.  The patient, Natasha Aguilar , was located at home, at the time of the visit.   The provider, Mitchell Boxer, M.D., was located at the office, [address] [place] [address], at the time of the visit.\par \par The patient, Natasha Aguilar and physician, Mitchell Boxer, M.D., participated in the telehealth encounter.\par \par Verbal consent obtained by  from patient.\par \par The majority of time (>50%) was spent on counseling and coordination of care with this patient and/or family member.  The approximate physician face to face time was 15 minutes for the diagnosis of moderate persistent asthma and allergic rhinitis. \par \par

## 2020-05-26 NOTE — HISTORY OF PRESENT ILLNESS
[Home] : at home, [unfilled] , at the time of the visit. [Verbal consent obtained from patient] : the patient, [unfilled] [Medical Office: ([facility])___] : at the medical office located in  [de-identified] : Patient is taking Advair 250/50 BID and Singulair and she is doing very well - not needing her rescue inhaler.   She had COVID antibodies performed - not sure where this was done but she thought the doctor told her she was positive.   She has not had COVID symptoms but she does work for a school district.

## 2020-06-03 ENCOUNTER — RX RENEWAL (OUTPATIENT)
Age: 65
End: 2020-06-03

## 2020-06-30 ENCOUNTER — APPOINTMENT (OUTPATIENT)
Dept: ALLERGY | Facility: CLINIC | Age: 65
End: 2020-06-30
Payer: COMMERCIAL

## 2020-06-30 PROCEDURE — 99213 OFFICE O/P EST LOW 20 MIN: CPT | Mod: 95

## 2020-06-30 NOTE — PHYSICAL EXAM
[Alert] : alert [Well Nourished] : well nourished [Healthy Appearance] : healthy appearance [No Acute Distress] : no acute distress [Well Developed] : well developed [Normal Voice/Communication] : normal voice communication [Wheezing] : no wheezing was heard [Judgment and Insight Age Appropriate] : judgement and insight is age appropriate [Normal Mood] : mood was normal [Normal Affect] : affect was normal [Alert, Awake, Oriented as Age-Appropriate] : alert, awake, oriented as age appropriate [de-identified] : no audible wheeze

## 2020-06-30 NOTE — HISTORY OF PRESENT ILLNESS
[Home] : at home, [unfilled] , at the time of the visit. [Medical Office: ([facility])___] : at the medical office located in  [Verbal consent obtained from patient] : the patient, [unfilled] [de-identified] : Patient is doing well with Advair and Singulair and azelastine BID - she has been out of the house - not working this summer

## 2020-06-30 NOTE — ASSESSMENT
[FreeTextEntry1] : Moderate persistent asthma:\par \par Continue Advair 250/50 BID \par Continue Singulair QD\par \par Allergic rhinitis:\par \par Continue azelastine BID\par \par This visit was provided via telehealth using real time 2-way audio visual technology.  The patient, Natasha Aguilar, was located at home, at the time of the visit.   The provider, Mitchell Boxer, M.D., was located at the office, [address] [place] [address], at the time of the visit.\par \par The patient, Natasha Aguilar  and physician, Mitchell Boxer, M.D., participated in the telehealth encounter.\par \par Verbal consent obtained by  from patient.\par \par The majority of time (>50%) was spent on counseling and coordination of care with this patient and/or family member.  The approximate physician face to face time was 15 minutes for the diagnosis of moderate persistent asthma. \par \par RV 3 months\par

## 2020-07-02 RX ORDER — AZELASTINE HYDROCHLORIDE 205.5 UG/1
0.15 SPRAY, METERED NASAL TWICE DAILY
Qty: 3 | Refills: 2 | Status: ACTIVE | COMMUNITY
Start: 2020-05-26 | End: 1900-01-01

## 2020-07-02 RX ORDER — ALBUTEROL SULFATE 90 UG/1
108 (90 BASE) INHALANT RESPIRATORY (INHALATION)
Qty: 3 | Refills: 2 | Status: ACTIVE | COMMUNITY
Start: 2020-04-07 | End: 1900-01-01

## 2020-08-10 ENCOUNTER — OUTPATIENT (OUTPATIENT)
Dept: OUTPATIENT SERVICES | Facility: HOSPITAL | Age: 65
LOS: 1 days | End: 2020-08-10
Payer: COMMERCIAL

## 2020-08-10 ENCOUNTER — APPOINTMENT (OUTPATIENT)
Dept: CT IMAGING | Facility: IMAGING CENTER | Age: 65
End: 2020-08-10
Payer: COMMERCIAL

## 2020-08-10 DIAGNOSIS — Z00.8 ENCOUNTER FOR OTHER GENERAL EXAMINATION: ICD-10-CM

## 2020-08-10 PROCEDURE — 74177 CT ABD & PELVIS W/CONTRAST: CPT | Mod: 26

## 2020-08-10 PROCEDURE — 74177 CT ABD & PELVIS W/CONTRAST: CPT

## 2020-08-10 PROCEDURE — 82565 ASSAY OF CREATININE: CPT

## 2020-09-14 ENCOUNTER — APPOINTMENT (OUTPATIENT)
Dept: HEMATOLOGY ONCOLOGY | Facility: CLINIC | Age: 65
End: 2020-09-14

## 2020-10-05 ENCOUNTER — APPOINTMENT (OUTPATIENT)
Dept: ALLERGY | Facility: CLINIC | Age: 65
End: 2020-10-05

## 2020-10-12 ENCOUNTER — APPOINTMENT (OUTPATIENT)
Dept: ALLERGY | Facility: CLINIC | Age: 65
End: 2020-10-12
Payer: COMMERCIAL

## 2020-10-12 PROCEDURE — 99213 OFFICE O/P EST LOW 20 MIN: CPT | Mod: 95

## 2020-10-12 NOTE — PHYSICAL EXAM
[Alert] : alert [Well Nourished] : well nourished [Healthy Appearance] : healthy appearance [No Acute Distress] : no acute distress [Well Developed] : well developed [Normal Voice/Communication] : normal voice communication [Wheezing] : wheezing was heard [Normal Mood] : mood was normal [Normal Affect] : affect was normal [Judgment and Insight Age Appropriate] : judgement and insight is age appropriate [Alert, Awake, Oriented as Age-Appropriate] : alert, awake, oriented as age appropriate [de-identified] : No audible wheeze

## 2020-10-12 NOTE — HISTORY OF PRESENT ILLNESS
[Home] : at home, [unfilled] , at the time of the visit. [Medical Office: ([facility])___] : at the medical office located in  [Verbal consent obtained from patient] : the patient, [unfilled] [de-identified] : Patient taking Wixela 250/50 - Singulair QD - she has not needed her rescue inhaler - at work she stays in her office - she is not exposed to the children.   She wears an N95 and face shield.   She received her flu shot.

## 2020-10-12 NOTE — ASSESSMENT
[FreeTextEntry1] : Moderate persistent asthma:\par \par Wixela 250/50 BID \par Singualir QD\par Proair 2 puffs QID prn \par \par This visit was provided via telehealth using real time 2-way audio visual technology.  The patient, Natasha Aguilar , was located at home, at the time of the visit.   The provider, Mitchell Boxer, M.D., was located at the office, [address] [place] [address], at the time of the visit.\par \par The patient, Natasha Aguilar and physician, Mitchell Boxer, M.D., participated in the telehealth encounter.\par \par Verbal consent obtained by  from patient.\par \par The majority of time (>50%) was spent on counseling and coordination of care with this patient and/or family member.  The approximate physician face to face time was 15 minutes for the diagnosis of moderate persistent asthma. \par \par RV 4 months \par

## 2020-11-02 RX ORDER — FLUTICASONE PROPIONATE AND SALMETEROL 250; 50 UG/1; UG/1
250-50 POWDER RESPIRATORY (INHALATION)
Qty: 3 | Refills: 3 | Status: ACTIVE | COMMUNITY
Start: 2020-04-06 | End: 1900-01-01

## 2020-11-25 ENCOUNTER — APPOINTMENT (OUTPATIENT)
Dept: ALLERGY | Facility: CLINIC | Age: 65
End: 2020-11-25
Payer: COMMERCIAL

## 2020-11-25 PROCEDURE — 99213 OFFICE O/P EST LOW 20 MIN: CPT | Mod: 95

## 2020-11-25 RX ORDER — PREDNISONE 10 MG/1
10 TABLET ORAL
Qty: 60 | Refills: 0 | Status: ACTIVE | COMMUNITY
Start: 2020-11-25 | End: 1900-01-01

## 2020-11-25 RX ORDER — FLUTICASONE PROPIONATE AND SALMETEROL 500; 50 UG/1; UG/1
500-50 POWDER RESPIRATORY (INHALATION)
Qty: 1 | Refills: 5 | Status: ACTIVE | COMMUNITY
Start: 2020-11-25 | End: 1900-01-01

## 2020-11-25 NOTE — HISTORY OF PRESENT ILLNESS
[Home] : at home, [unfilled] , at the time of the visit. [Medical Office: ([facility])___] : at the medical office located in  [Verbal consent obtained from patient] : the patient, [unfilled] [de-identified] : Patient expressed concern about increased need for use of rescue inhaler BID x past 3 days.   She has been taking Wixela 250/50 BID.

## 2020-11-25 NOTE — ASSESSMENT
[FreeTextEntry1] : Moderate persistent asthma with acute exacerbation:\par \par Increase Wixela to 500/50 BID \par Prednisone 40 mg QD x 3 days if she has increased symptoms over the weekend\par Follow up telehealth next week. \par \par This visit was provided via telehealth using real time 2-way audio visual technology.  The patient, Natasha Aguilar , was located at home, at the time of the visit.   The provider, Mitchell Boxer, M.D., was located at the office, [address] [place] [address], at the time of the visit.\par \par The patient, Natasha Aguilar and physician, Mitchell Boxer, M.D., participated in the telehealth encounter.\par \par Verbal consent obtained by  from patient.\par \par The majority of time (>50%) was spent on counseling and coordination of care with this patient and/or family member.  The approximate physician face to face time was 15 minutes for the diagnosis of moderate persistent asthma. \par

## 2020-11-25 NOTE — PHYSICAL EXAM
[Alert] : alert [Well Nourished] : well nourished [Healthy Appearance] : healthy appearance [No Acute Distress] : no acute distress [Well Developed] : well developed [Normal Voice/Communication] : normal voice communication [Wheezing] : no wheezing was heard [Normal Mood] : mood was normal [Normal Affect] : affect was normal [Judgment and Insight Age Appropriate] : judgement and insight is age appropriate [Alert, Awake, Oriented as Age-Appropriate] : alert, awake, oriented as age appropriate [de-identified] : no audible wheeze

## 2020-11-30 ENCOUNTER — APPOINTMENT (OUTPATIENT)
Dept: ALLERGY | Facility: CLINIC | Age: 65
End: 2020-11-30
Payer: COMMERCIAL

## 2020-11-30 PROCEDURE — 99213 OFFICE O/P EST LOW 20 MIN: CPT | Mod: 95

## 2020-11-30 RX ORDER — HYOSCYAMINE SULFATE 0.12 MG/1
0.12 TABLET SUBLINGUAL
Qty: 90 | Refills: 0 | Status: ACTIVE | COMMUNITY
Start: 2020-10-01

## 2020-11-30 RX ORDER — ESCITALOPRAM OXALATE 10 MG/1
10 TABLET ORAL
Qty: 30 | Refills: 0 | Status: ACTIVE | COMMUNITY
Start: 2020-11-11

## 2020-11-30 RX ORDER — AMLODIPINE BESYLATE 5 MG/1
5 TABLET ORAL
Qty: 30 | Refills: 0 | Status: ACTIVE | COMMUNITY
Start: 2020-11-11

## 2020-11-30 RX ORDER — LINACLOTIDE 145 UG/1
145 CAPSULE, GELATIN COATED ORAL
Qty: 30 | Refills: 0 | Status: ACTIVE | COMMUNITY
Start: 2020-11-20

## 2020-11-30 RX ORDER — FAMOTIDINE 20 MG/1
20 TABLET, FILM COATED ORAL
Qty: 60 | Refills: 0 | Status: ACTIVE | COMMUNITY
Start: 2020-08-28

## 2020-11-30 NOTE — ASSESSMENT
[FreeTextEntry1] : Moderate persistent asthma:\par \par Prednisone 10 mg QD x 7 days\par Continue Wixela 500/50 BID\par Continue Singulair QD\par Continue albuterol 2 puffs QID prn \par \par This visit was provided via telehealth using real time 2-way audio visual technology.  The patient,  Natasha Aguilar,  was located at home, at the time of the visit.   The provider, Mitchell Boxer, M.D., was located at the office, [address] [place] [address], at the time of the visit.\par \par The patient, Natasha Aguilar and physician, Mitchell Boxer, M.D., participated in the telehealth encounter.\par \par Verbal consent obtained by  from patient.\par \par The majority of time (>50%) was spent on counseling and coordination of care with this patient and/or family member.  The approximate physician face to face time was 15 minutes for the diagnosis of moderate persistent asthma.\par

## 2020-11-30 NOTE — HISTORY OF PRESENT ILLNESS
[Home] : at home, [unfilled] , at the time of the visit. [Medical Office: ([facility])___] : at the medical office located in  [Verbal consent obtained from patient] : the patient, [unfilled] [de-identified] : Patient has tapered her prednisone based upon the advice of the pharmacist - also taking WIxela 500/50 BID.    She feels back to herself and no longer wheezing.

## 2020-11-30 NOTE — PHYSICAL EXAM
[Alert] : alert [Well Nourished] : well nourished [Healthy Appearance] : healthy appearance [No Acute Distress] : no acute distress [Well Developed] : well developed [Normal Voice/Communication] : normal voice communication [Wheezing] : no wheezing was heard [Normal Mood] : mood was normal [Normal Affect] : affect was normal [Judgment and Insight Age Appropriate] : judgement and insight is age appropriate [Alert, Awake, Oriented as Age-Appropriate] : alert, awake, oriented as age appropriate [de-identified] : no audible wheeze

## 2021-06-08 ENCOUNTER — RX RENEWAL (OUTPATIENT)
Age: 66
End: 2021-06-08

## 2021-12-06 ENCOUNTER — NON-APPOINTMENT (OUTPATIENT)
Age: 66
End: 2021-12-06

## 2021-12-08 ENCOUNTER — APPOINTMENT (OUTPATIENT)
Dept: GASTROENTEROLOGY | Facility: CLINIC | Age: 66
End: 2021-12-08
Payer: MEDICARE

## 2021-12-08 VITALS
RESPIRATION RATE: 17 BRPM | DIASTOLIC BLOOD PRESSURE: 80 MMHG | WEIGHT: 189 LBS | HEART RATE: 83 BPM | BODY MASS INDEX: 34.78 KG/M2 | HEIGHT: 62 IN | SYSTOLIC BLOOD PRESSURE: 125 MMHG | TEMPERATURE: 98 F | OXYGEN SATURATION: 94 %

## 2021-12-08 DIAGNOSIS — Z63.4 DISAPPEARANCE AND DEATH OF FAMILY MEMBER: ICD-10-CM

## 2021-12-08 DIAGNOSIS — R68.81 EARLY SATIETY: ICD-10-CM

## 2021-12-08 DIAGNOSIS — R10.13 EPIGASTRIC PAIN: ICD-10-CM

## 2021-12-08 PROCEDURE — 99214 OFFICE O/P EST MOD 30 MIN: CPT

## 2021-12-08 SDOH — SOCIAL STABILITY - SOCIAL INSECURITY: DISSAPEARANCE AND DEATH OF FAMILY MEMBER: Z63.4

## 2021-12-08 NOTE — PHYSICAL EXAM
[General Appearance - Alert] : alert [General Appearance - In No Acute Distress] : in no acute distress [General Appearance - Well Nourished] : well nourished [General Appearance - Well Developed] : well developed [General Appearance - Well-Appearing] : healthy appearing [Sclera] : the sclera and conjunctiva were normal [Neck Appearance] : the appearance of the neck was normal [Neck Cervical Mass (___cm)] : no neck mass was observed [Respiration, Rhythm And Depth] : normal respiratory rhythm and effort [Exaggerated Use Of Accessory Muscles For Inspiration] : no accessory muscle use [Auscultation Breath Sounds / Voice Sounds] : lungs were clear to auscultation bilaterally [Heart Rate And Rhythm] : heart rate was normal and rhythm regular [Heart Sounds] : normal S1 and S2 [Heart Sounds Gallop] : no gallops [Murmurs] : no murmurs [Heart Sounds Pericardial Friction Rub] : no pericardial rub [Edema] : there was no peripheral edema [Bowel Sounds] : normal bowel sounds [Abdomen Soft] : soft [] : no hepato-splenomegaly [Abdomen Mass (___ Cm)] : no abdominal mass palpated [Abdomen Hernia] : no hernia was discovered [Cervical Lymph Nodes Enlarged Posterior Bilaterally] : posterior cervical [Cervical Lymph Nodes Enlarged Anterior Bilaterally] : anterior cervical [Supraclavicular Lymph Nodes Enlarged Bilaterally] : supraclavicular [Abnormal Walk] : normal gait [Nail Clubbing] : no clubbing  or cyanosis of the fingernails [Skin Color & Pigmentation] : normal skin color and pigmentation [Oriented To Time, Place, And Person] : oriented to person, place, and time [Impaired Insight] : insight and judgment were intact [Affect] : the affect was normal [Mood] : the mood was normal [FreeTextEntry1] : A healed transverse right upper quadrant scar is noted. The abdomen is obese, soft, nontender, nondistended and without mass or hepatosplenomegaly.  Focal localized tenderness noted at right costal margin.

## 2021-12-08 NOTE — REASON FOR VISIT
[Follow-Up: _____] : a [unfilled] follow-up visit [FreeTextEntry1] : for evaluation of postprandial bloating and early satiety.

## 2021-12-08 NOTE — HISTORY OF PRESENT ILLNESS
[FreeTextEntry1] : Office revisit on 2021.\par \par The patient presents for evaluation regarding complaints of postprandial upper abdominal bloating, discomfort and early satiety.\par \par Patient was previously evaluated for office consultation 19.\par \par CONSULTATION NOTE:\par \par The patient is a 63-year-old woman evaluated for consultation in preparation for a surveillance colonoscopy. PMD: Dr. Joie Marie.\par \par The patient has one to 2 formed bowel movements daily without any complaints of diarrhea, constipation, change in bowel habit or rectal bleeding.\par \par The patient has a long history of "indigestion" of many years duration. This has been episodic over the years. More recently, this has been so less trouble than in the past. Complaint of indigestion is described as heartburn, upper abdominal bloating, gaseous feeling and feeling of need to belch. Gets relief with Pepcid. Although describes feeling full easily she is able to have a regular full-sized meal. These episodes of indigestion can occur approximately one to 2 times per month. Episodically over the years she has been on various antisecretory therapies. For the past 6 months she has been on pantoprazole 40 mg q.d. with symptomatic improvement.\par \par Appetite is stable. Patient may have lost several pounds but this was intentional. No significant dysphagia. Occasionally can feel sense of retrosternal bolus hangup. Denies nausea or vomiting. As noted, can experience heartburn with the indigestion episodes.\par \par  The patient underwent a surveillance colonoscopy on 14. Total colonoscopy was performed to the cecum with ileoscopy. A 3 millimeter ascending colon adenoma, a 3 mm proximal transverse colon adenoma and a 1 mm rectal hyperplastic polyp were removed. Anal skin tags were noted. Mild sigmoid diverticulosis was detected. \par \par The patient also underwent an upper endoscopy to evaluate dyspepsia. The esophagus, stomach and duodenum were macroscopically normal. An antral biopsy revealed chronic inactive gastritis. Testing for Helicobacter pylori was negative.\par \par The patient has a history of gallstones. She previously underwent an open cholecystectomy in the . The patient reports no other history of digestive illness. Family history of colon cancer is not reported.\par \par CURRENT HISTORY:\par \par COLONOSCOPY 2019:     -Surveillance colonoscopy was performed on 19. Patient previously had removal of colonic adenomas. Total colonoscopy was performed to the cecum. Sigmoid diverticulosis was noted. A single small ascending colon diverticulum was detected. External hemorrhoids were noted. The colonoscopy examination was otherwise normal. A surveillance colonoscopy is advised in 5 years. \par \par ORV 2021:     -Presents for evaluation regarding approximately 2 months of complaints of upper abdominal bloating, abdominal discomfort, early satiety and feelings of prolonged postprandial fullness.  Under significant stress.    related to cardiac issues on 2021.  Patient retired as  2021.  Otherwise, reports no other precipitating factors such as diet change, new medications etc.  Complaints of upper abdominal bloating and discomfort can occur either before or after meals.  Implicates no particular food.  Describes sense of upper abdominal bloating, distention and mild discomfort.  Describes early satiety and feeling of prolonged postprandial fullness.\par                                -Appetite is stable.  May have lost a few pounds but not much.  Denies complaints of any fever or oral ulcers.  Denies dysphagia and reports swallowing liquids without choking, coughing or nasal regurgitation.  Swallow solid food without any complaint of retrosternal bolus hang up.  Can occasionally have mild nausea but no vomiting.  Reports no abdominal pain at other sites.  Typically has one or two formed bowel movements daily without any current complaints of diarrhea, change in bowel habit or rectal bleeding.  Occasionally can feel mildly constipated.\par                               -Has history of GERD and is currently on a regimen of pantoprazole 40 mg daily.  Not describing significant heartburn, regurgitation or other GERD symptoms.\par                              -On her own over the past week the patient has started over-the-counter items including a product called "gut motility" which contains arctichoke leaf, migel root and apple cider vinegar.  She is also taking a digest spectrum enzyme product containing numerous enzymes including amylase, lipase and others.  She indicates feeling somewhat improved on that regimen.

## 2021-12-08 NOTE — ASSESSMENT
[FreeTextEntry1] : Impression:\par \par #1 dyspepsia–experiencing dysmotility dyspepsia symptoms of approximately 2 months duration with complaints of upper abdominal bloating and discomfort, early satiety and prolonged postprandial fullness.  Suspect functional etiology attributed to a dysmotility dyspepsia.  Evaluate for any possibility of gastric neoplastic process.  Evaluate for gastroparesis.  Tenderness elicited at right costal margin noted and musculoskeletal component of upper abdominal discomfort may be contributory to the patient's upper abdominal discomfort.\par \par #2 history of colonic adenomatous polyps-colonoscopy in 8/18/14 noted for removal of a 3 mm ascending colon adenoma and 3 mm transverse colon adenoma.  Surveillance colonoscopy on 8/19/2019 without detection of metachronous lesions.\par \par #3 GERD- previously reported chronic symptoms of heartburn. EGD on 8/18/14 noted for normal esophagus without detection of erosive esophagitis, Johnson's esophagus or hiatus hernia.  Not currently complaining of heartburn, regurgitation or typical GERD symptoms.\par \par #4 sigmoid diverticulosis.\par \par #5 obesity.\par \par #6 left breast cancer 2011-status post lumpectomy, chemotherapy and radiation therapy.\par \par #7 sickle cell trait.\par \par #8 history of gallstones-status post open cholecystectomy 1992.\par \par #9 status post episode of hematuria.\par \par #10 asthma

## 2021-12-08 NOTE — CONSULT LETTER
[Dear  ___] : Dear  [unfilled], [Consult Letter:] : I had the pleasure of evaluating your patient, [unfilled]. [Please see my note below.] : Please see my note below. [Consult Closing:] : Thank you very much for allowing me to participate in the care of this patient.  If you have any questions, please do not hesitate to contact me. [Sincerely,] : Sincerely, [FreeTextEntry2] : Dr. Joie Morales [FreeTextEntry3] : Sundeep Carter M.D.

## 2022-01-04 ENCOUNTER — APPOINTMENT (OUTPATIENT)
Dept: NUCLEAR MEDICINE | Facility: HOSPITAL | Age: 67
End: 2022-01-04
Payer: MEDICARE

## 2022-01-04 ENCOUNTER — OUTPATIENT (OUTPATIENT)
Dept: OUTPATIENT SERVICES | Facility: HOSPITAL | Age: 67
LOS: 1 days | End: 2022-01-04

## 2022-01-04 DIAGNOSIS — R68.81 EARLY SATIETY: ICD-10-CM

## 2022-01-04 PROCEDURE — G1004: CPT

## 2022-01-04 PROCEDURE — 78264 GASTRIC EMPTYING IMG STUDY: CPT | Mod: 26,ME

## 2022-01-11 ENCOUNTER — APPOINTMENT (OUTPATIENT)
Dept: GASTROENTEROLOGY | Facility: HOSPITAL | Age: 67
End: 2022-01-11

## 2022-01-11 ENCOUNTER — OUTPATIENT (OUTPATIENT)
Dept: OUTPATIENT SERVICES | Facility: HOSPITAL | Age: 67
LOS: 1 days | Discharge: HOME | End: 2022-01-11
Payer: MEDICARE

## 2022-01-11 ENCOUNTER — RESULT REVIEW (OUTPATIENT)
Age: 67
End: 2022-01-11

## 2022-01-11 VITALS
HEART RATE: 64 BPM | OXYGEN SATURATION: 99 % | DIASTOLIC BLOOD PRESSURE: 57 MMHG | SYSTOLIC BLOOD PRESSURE: 109 MMHG | RESPIRATION RATE: 12 BRPM

## 2022-01-11 VITALS
OXYGEN SATURATION: 98 % | DIASTOLIC BLOOD PRESSURE: 71 MMHG | HEART RATE: 68 BPM | SYSTOLIC BLOOD PRESSURE: 125 MMHG | TEMPERATURE: 98 F | WEIGHT: 190.04 LBS | HEIGHT: 62 IN | RESPIRATION RATE: 17 BRPM

## 2022-01-11 DIAGNOSIS — R68.81 EARLY SATIETY: ICD-10-CM

## 2022-01-11 PROCEDURE — 88341 IMHCHEM/IMCYTCHM EA ADD ANTB: CPT | Mod: 26

## 2022-01-11 PROCEDURE — 88342 IMHCHEM/IMCYTCHM 1ST ANTB: CPT | Mod: 26

## 2022-01-11 PROCEDURE — 43239 EGD BIOPSY SINGLE/MULTIPLE: CPT

## 2022-01-11 PROCEDURE — 88305 TISSUE EXAM BY PATHOLOGIST: CPT | Mod: 26

## 2022-01-11 NOTE — ASU PREOP CHECKLIST - NS PREOP CHK MONITOR ANESTHESIA CONSENT
no lesions, no deformities, no traumatic injuries, no significant scars are present, chest wall non-tender, no masses present, tactile fremitus is normal, breathing is unlabored without accessory muscle use., normal breath sounds. done

## 2022-01-11 NOTE — ASU PATIENT PROFILE, ADULT - FALL HARM RISK - UNIVERSAL INTERVENTIONS
Bed in lowest position, wheels locked, appropriate side rails in place/Call bell, personal items and telephone in reach/Instruct patient to call for assistance before getting out of bed or chair/Non-slip footwear when patient is out of bed/[place] to call system/Physically safe environment - no spills, clutter or unnecessary equipment/Purposeful Proactive Rounding/Room/bathroom lighting operational, light cord in reach

## 2022-01-11 NOTE — ASU PATIENT PROFILE, ADULT - NS TRANSFER PATIENT BELONGINGS
1 butterfly necklace, 1 bracelet, one apple watch in locker. 2 rings and one bracelet is on patient/Jewelry/Money (specify)/Clothing

## 2022-01-20 LAB — SURGICAL PATHOLOGY STUDY: SIGNIFICANT CHANGE UP

## 2022-01-22 DIAGNOSIS — A04.8 OTHER SPECIFIED BACTERIAL INTESTINAL INFECTIONS: ICD-10-CM

## 2022-01-22 RX ORDER — BISMUTH SUBCITRATE POTASSIUM, METRONIDAZOLE, AND TETRACYCLINE HYDROCHLORIDE 140; 125; 125 MG/1; MG/1; MG/1
140-125-125 CAPSULE ORAL
Qty: 120 | Refills: 0 | Status: ACTIVE | COMMUNITY
Start: 2022-01-22 | End: 1900-01-01

## 2022-01-24 RX ORDER — METRONIDAZOLE 250 MG/1
250 TABLET ORAL EVERY 6 HOURS
Qty: 56 | Refills: 0 | Status: ACTIVE | COMMUNITY
Start: 2022-01-24 | End: 1900-01-01

## 2022-01-24 RX ORDER — BISMUTH SUBSALICYLATE 262 MG
262 TABLET,CHEWABLE ORAL 4 TIMES DAILY
Qty: 112 | Refills: 0 | Status: ACTIVE | COMMUNITY
Start: 2022-01-24 | End: 1900-01-01

## 2022-01-24 RX ORDER — TETRACYCLINE HYDROCHLORIDE 500 MG/1
500 CAPSULE ORAL EVERY 6 HOURS
Qty: 56 | Refills: 0 | Status: ACTIVE | COMMUNITY
Start: 2022-01-24 | End: 1900-01-01

## 2022-01-25 ENCOUNTER — NON-APPOINTMENT (OUTPATIENT)
Age: 67
End: 2022-01-25

## 2022-03-16 LAB — UREA BREATH TEST QL: NEGATIVE

## 2022-03-23 ENCOUNTER — RESULT REVIEW (OUTPATIENT)
Age: 67
End: 2022-03-23

## 2022-04-11 PROBLEM — Z11.59 SCREENING FOR VIRAL DISEASE: Status: ACTIVE | Noted: 2020-05-26

## 2022-11-03 PROBLEM — J45.901 UNSPECIFIED ASTHMA WITH (ACUTE) EXACERBATION: Chronic | Status: ACTIVE | Noted: 2022-01-11

## 2022-11-03 PROBLEM — C50.919 MALIGNANT NEOPLASM OF UNSPECIFIED SITE OF UNSPECIFIED FEMALE BREAST: Chronic | Status: ACTIVE | Noted: 2022-01-11

## 2022-11-07 ENCOUNTER — APPOINTMENT (OUTPATIENT)
Dept: SURGERY | Facility: CLINIC | Age: 67
End: 2022-11-07

## 2022-11-07 VITALS
SYSTOLIC BLOOD PRESSURE: 128 MMHG | DIASTOLIC BLOOD PRESSURE: 83 MMHG | HEART RATE: 80 BPM | TEMPERATURE: 98 F | BODY MASS INDEX: 35.7 KG/M2 | WEIGHT: 194 LBS | HEIGHT: 62 IN

## 2022-11-07 DIAGNOSIS — R10.10 UPPER ABDOMINAL PAIN, UNSPECIFIED: ICD-10-CM

## 2022-11-07 PROCEDURE — 99203 OFFICE O/P NEW LOW 30 MIN: CPT

## 2022-11-07 NOTE — PHYSICAL EXAM
[Respiratory Effort] : normal respiratory effort [Alert] : alert [Oriented to Person] : oriented to person [Oriented to Place] : oriented to place [Oriented to Time] : oriented to time [Calm] : calm [JVD] : no jugular venous distention  [Abdominal Masses] : No abdominal masses [Abdomen Tenderness] : ~T ~M No abdominal tenderness [de-identified] : BLAKE FAITH NAD [de-identified] : EOMI [de-identified] : soft, Obese abdomen. No obvious hernia appreciated. mild diastasis\par

## 2022-11-07 NOTE — HISTORY OF PRESENT ILLNESS
[de-identified] : 66 yo female with h/o GERD and obesity presents for evaluation of upper abdominal pain x 6 months. She states her PMD told her she may have a ventral hernia. PAtient states discomfort with certain movements. Denies any nausea or vomiting. She states she always feels bloated. No obvious bulge. + h/o open cholecystectomy.

## 2022-11-07 NOTE — ASSESSMENT
[FreeTextEntry1] : 68 yo female with 6 months of upper abdominal pain, no hernia appreciated on exam. CT from 2020 was normal.\par Discussed some abdominal wall exercises and f/u if no improvement. Will consider CT in the future.

## 2024-04-24 ENCOUNTER — APPOINTMENT (OUTPATIENT)
Dept: GASTROENTEROLOGY | Facility: CLINIC | Age: 69
End: 2024-04-24
Payer: MEDICARE

## 2024-04-24 VITALS
HEART RATE: 81 BPM | OXYGEN SATURATION: 96 % | SYSTOLIC BLOOD PRESSURE: 131 MMHG | HEIGHT: 62 IN | WEIGHT: 178 LBS | BODY MASS INDEX: 32.76 KG/M2 | DIASTOLIC BLOOD PRESSURE: 81 MMHG

## 2024-04-24 DIAGNOSIS — Z12.11 ENCOUNTER FOR SCREENING FOR MALIGNANT NEOPLASM OF COLON: ICD-10-CM

## 2024-04-24 PROCEDURE — 99213 OFFICE O/P EST LOW 20 MIN: CPT

## 2024-04-24 RX ORDER — PEG-3350, SODIUM SULFATE, SODIUM CHLORIDE, POTASSIUM CHLORIDE, SODIUM ASCORBATE AND ASCORBIC ACID 7.5-2.691G
100 KIT ORAL
Qty: 1 | Refills: 0 | Status: ACTIVE | COMMUNITY
Start: 2024-04-24 | End: 1900-01-01

## 2024-04-24 NOTE — ASSESSMENT
[FreeTextEntry1] : Impression:  #1 history of colonic adenomatous polyps-colonoscopy in 8/18/14 noted for removal of a 3 mm ascending colon adenoma and 3 mm transverse colon adenoma.  Surveillance colonoscopy on 8/19/2019 without detection of metachronous lesions.  Rule out metachronous lesions.  #2 history Helicobacter pylori-successful eradication following regimen of PPI/BMT as confirmed by negative Helicobacter pylori urea breath test on 3/15/2022.  #3 history of dyspepsia-resolved at current time.  #4 GERD- previously reported chronic symptoms of heartburn. EGD on 8/18/14 noted for normal esophagus without detection of erosive esophagitis, Johnson's esophagus or hiatus hernia.  No significant GERD symptoms at current time-only infrequent occasional heartburn.  Alarm symptoms not reported.  #5 sigmoid diverticulosis.  #6 obesity.  #7 status post cholecystectomy 1992.  General medical problems:  # left breast cancer 2011-status post lumpectomy, chemotherapy and radiation therapy.  # sickle cell trait.  # status post episode of hematuria.  # asthma

## 2024-04-24 NOTE — PHYSICAL EXAM
[Alert] : alert [Normal Voice/Communication] : normal voice/communication [No Acute Distress] : no acute distress [Obese (BMI >= 30)] : obese (BMI >= 30) [Sclera] : the sclera and conjunctiva were normal [Normal Appearance] : the appearance of the neck was normal [No Respiratory Distress] : no respiratory distress [No Acc Muscle Use] : no accessory muscle use [Respiration, Rhythm And Depth] : normal respiratory rhythm and effort [Auscultation Breath Sounds / Voice Sounds] : lungs were clear to auscultation bilaterally [Heart Rate And Rhythm] : heart rate was normal and rhythm regular [Normal S1, S2] : normal S1 and S2 [Murmurs] : no murmurs [None] : no edema [Bowel Sounds] : normal bowel sounds [Abdomen Tenderness] : non-tender [No Masses] : no abdominal mass palpated [Abdomen Soft] : soft [] : no hepatosplenomegaly [Abnormal Walk] : normal gait [No Clubbing, Cyanosis] : no clubbing or cyanosis of the fingernails [Normal Color / Pigmentation] : normal skin color and pigmentation [Oriented To Time, Place, And Person] : oriented to person, place, and time [Normal Affect] : the affect was normal [Normal Insight/Judgment] : insight and judgment were intact [Normal Mood] : the mood was normal

## 2024-04-24 NOTE — HISTORY OF PRESENT ILLNESS
[FreeTextEntry1] : Office revisit on 2024.  The patient presents for follow-up in preparation for a surveillance colonoscopy.  Patient was previously evaluated for office consultation 19.  CONSULTATION NOTE:  The patient is a 63-year-old woman evaluated for consultation in preparation for a surveillance colonoscopy. PMD: Dr. Joie Marie.  The patient has one to 2 formed bowel movements daily without any complaints of diarrhea, constipation, change in bowel habit or rectal bleeding.  The patient has a long history of "indigestion" of many years duration. This has been episodic over the years. More recently, this has been so less trouble than in the past. Complaint of indigestion is described as heartburn, upper abdominal bloating, gaseous feeling and feeling of need to belch. Gets relief with Pepcid. Although describes feeling full easily she is able to have a regular full-sized meal. These episodes of indigestion can occur approximately one to 2 times per month. Episodically over the years she has been on various antisecretory therapies. For the past 6 months she has been on pantoprazole 40 mg q.d. with symptomatic improvement.  Appetite is stable. Patient may have lost several pounds but this was intentional. No significant dysphagia. Occasionally can feel sense of retrosternal bolus hangup. Denies nausea or vomiting. As noted, can experience heartburn with the indigestion episodes.   The patient underwent a surveillance colonoscopy on 14. Total colonoscopy was performed to the cecum with ileoscopy. A 3 millimeter ascending colon adenoma, a 3 mm proximal transverse colon adenoma and a 1 mm rectal hyperplastic polyp were removed. Anal skin tags were noted. Mild sigmoid diverticulosis was detected.   The patient also underwent an upper endoscopy to evaluate dyspepsia. The esophagus, stomach and duodenum were macroscopically normal. An antral biopsy revealed chronic inactive gastritis. Testing for Helicobacter pylori was negative.  The patient has a history of gallstones. She previously underwent an open cholecystectomy in the . The patient reports no other history of digestive illness. Family history of colon cancer is not reported.  CURRENT HISTORY:  COLONOSCOPY 2019:     -Surveillance colonoscopy was performed on 19. Patient previously had removal of colonic adenomas. Total colonoscopy was performed to the cecum. Sigmoid diverticulosis was noted. A single small ascending colon diverticulum was detected. External hemorrhoids were noted. The colonoscopy examination was otherwise normal. A surveillance colonoscopy is advised in 5 years.   ORV 2021:     -Presents for evaluation regarding approximately 2 months of complaints of upper abdominal bloating, abdominal discomfort, early satiety and feelings of prolonged postprandial fullness.  Under significant stress.    related to cardiac issues on 2021.  Patient retired as  2021.  Otherwise, reports no other precipitating factors such as diet change, new medications etc.  Complaints of upper abdominal bloating and discomfort can occur either before or after meals.  Implicates no particular food.  Describes sense of upper abdominal bloating, distention and mild discomfort.  Describes early satiety and feeling of prolonged postprandial fullness.                                -Appetite is stable.  May have lost a few pounds but not much.  Denies complaints of any fever or oral ulcers.  Denies dysphagia and reports swallowing liquids without choking, coughing or nasal regurgitation.  Swallow solid food without any complaint of retrosternal bolus hang up.  Can occasionally have mild nausea but no vomiting.  Reports no abdominal pain at other sites.  Typically has one or two formed bowel movements daily without any current complaints of diarrhea, change in bowel habit or rectal bleeding.  Occasionally can feel mildly constipated.                               -Has history of GERD and is currently on a regimen of pantoprazole 40 mg daily.  Not describing significant heartburn, regurgitation or other GERD symptoms.                              -On her own over the past week the patient has started over-the-counter items including a product called "gut motility" which contains arctichoke leaf, ginger root and apple cider vinegar.  She is also taking a digest spectrum enzyme product containing numerous enzymes including amylase, lipase and others.  She indicates feeling somewhat improved on that regimen.  EGD 2022:     -EGD was performed on 2022 to evaluate dyspepsia. Examination of the esophagus was normal. Examination of the stomach was noted for mild nonerosive erythema of the gastric body. A 6 mm submucosal posterior wall gastric antrum polyp was noted. Well biopsies were performed revealing chronic inactive gastritis and foveolar hyperplasia without detection of dysplasia or other findings. Biopsy of the gastric antrum was noted for chronic focal active gastritis with rare Helicobacter pylori. Gastric body biopsy revealed chronic inactive gastritis and testing was negative for Helicobacter pylori. Gastric intestinal metaplasia was not detected at any site. Examination of the duodenal bulb was noted for a 5 mm erythematous anterior wall duodenal bulb polyp with histology noted for nodular chronic peptic duodenitis. Duodenal biopsy was noted for intact villous architecture and no features of celiac disease. Duodenal bulb biopsy was consistent with focal foveolar metaplasia consistent with chronic duodenitis.                               -Helicobacter pylori detection discussed. Possible significance reviewed including risk of peptic ulcer disease and gastric neoplasm. Only rare Helicobacter pylori noted but patient is on pantoprazole which may have been suppressing Helicobacter pylori. Treatment discussed. Regimen with Pylera prescribed which will be combined with pantoprazole 40 mg twice daily. Patient made aware that Pylera may not be covered. Patient advised to contact me and if not recovered will prescribe tetracycline 500 mg 4 times daily, metronidazole 250 mg 4 times daily and Pepto-Bismol 2 tablets chewed p.o. 4 times daily for 14-day regimen to be combined with pantoprazole 40 mg twice daily. Advised to not start medications until discussion and provision of detailed medication administration instructions.   ORV 2024:     -Evaluated in preparation for surveillance colonoscopy.  Overall, feels well and stable from GI standpoint.  Appetite stable and has had a several pound intentional weight loss.  Denies complaints of dysphagia, nausea or vomiting.  Episodes of heartburn are infrequent.  Denies abdominal pain.  Has daily formed bowel movements without complaints of diarrhea or rectal bleeding.  Can have occasional mild constipation.  Not on any current medications.  Supplements-vitamin D but will utilize Allegra as needed for allergies and albuterol as needed for asthma.

## 2024-04-24 NOTE — ADDENDUM
[FreeTextEntry1] : Plan:  The patient will be scheduled for a surveillance colonoscopy.  I had an extensive discussion with the patient including risks, benefits and alternatives possibly including bleeding, perforation, inadvertent injury to contiguous organs including spleen, acute colitis, need for blood transfusion or surgery, infection, and medication and anesthetic risk. The limitations of colonoscopy were discussed including missed polyps and cancer.  Possible medication and anesthesia related adverse cardiovascular and respiratory reactions were reviewed.  The patient wishes to proceed and will be scheduled for a surveillance colonoscopy.  The rationale of colonoscopy was discussed not only in terms of the early detection of colon cancer, but also as a means of prevention in the event of removal of a polyp.  The limitations of colonoscopy were discussed, and the patient is aware that not every cancer is prevented.  The patient will utilize the Moviprep colonoscopy preparation in split fashion and the administration of this product was discussed.

## 2024-06-06 ENCOUNTER — APPOINTMENT (OUTPATIENT)
Dept: GASTROENTEROLOGY | Facility: HOSPITAL | Age: 69
End: 2024-06-06

## 2024-06-06 ENCOUNTER — NON-APPOINTMENT (OUTPATIENT)
Age: 69
End: 2024-06-06

## 2024-06-06 ENCOUNTER — OUTPATIENT (OUTPATIENT)
Dept: OUTPATIENT SERVICES | Facility: HOSPITAL | Age: 69
LOS: 1 days | Discharge: HOME | End: 2024-06-06
Payer: MEDICARE

## 2024-06-06 VITALS
SYSTOLIC BLOOD PRESSURE: 106 MMHG | HEART RATE: 60 BPM | DIASTOLIC BLOOD PRESSURE: 67 MMHG | OXYGEN SATURATION: 98 % | RESPIRATION RATE: 16 BRPM

## 2024-06-06 VITALS
TEMPERATURE: 97 F | RESPIRATION RATE: 18 BRPM | SYSTOLIC BLOOD PRESSURE: 123 MMHG | HEART RATE: 71 BPM | DIASTOLIC BLOOD PRESSURE: 84 MMHG | OXYGEN SATURATION: 98 % | HEIGHT: 62 IN | WEIGHT: 177.91 LBS

## 2024-06-06 DIAGNOSIS — Z12.11 ENCOUNTER FOR SCREENING FOR MALIGNANT NEOPLASM OF COLON: ICD-10-CM

## 2024-06-06 PROCEDURE — 45378 DIAGNOSTIC COLONOSCOPY: CPT

## 2024-06-06 RX ORDER — SODIUM CHLORIDE 9 MG/ML
500 INJECTION, SOLUTION INTRAVENOUS
Refills: 0 | Status: COMPLETED | OUTPATIENT
Start: 2024-06-06 | End: 2024-06-06

## 2024-06-06 RX ADMIN — SODIUM CHLORIDE 30 MILLILITER(S): 9 INJECTION, SOLUTION INTRAVENOUS at 10:45

## (undated) DEVICE — CATH IV SAFE BC 22G X 1" (BLUE)

## (undated) DEVICE — ELCTR ECG CONDUCTIVE ADHESIVE

## (undated) DEVICE — CONTAINER FORMALIN 10% 20ML

## (undated) DEVICE — TUBING MEDI-VAC W MAXIGRIP CONNECTORS 1/4"X6'

## (undated) DEVICE — TUBING IV SET GRAVITY 3Y 100" MACRO

## (undated) DEVICE — BASIN EMESIS 10IN GRADUATED MAUVE

## (undated) DEVICE — CONTAINER FORMALIN 80ML YELLOW

## (undated) DEVICE — LUBRICATING JELLY HR ONE SHOT 3G

## (undated) DEVICE — CLAMP BX HOT RAD JAW 3

## (undated) DEVICE — PACK IV START WITH CHG

## (undated) DEVICE — FACESHIELD FULL VISOR

## (undated) DEVICE — BIOPSY FORCEP COLD DISP

## (undated) DEVICE — GOWN LG

## (undated) DEVICE — TUBING SUCTION NONCONDUCTIVE 6MM X 12FT

## (undated) DEVICE — DRSG 2X2

## (undated) DEVICE — BIOPSY FORCEP RADIAL JAW 4 STANDARD WITH NEEDLE

## (undated) DEVICE — UNDERPAD LINEN SAVER 17 X 24"

## (undated) DEVICE — LINE BREATHE SAMPLNG

## (undated) DEVICE — DRSG CURITY GAUZE SPONGE 4 X 4" 12-PLY NON-STERILE

## (undated) DEVICE — DENTURE CUP PINK

## (undated) DEVICE — DRSG BANDAID 0.75X3"

## (undated) DEVICE — ELCTR GROUNDING PAD ADULT COVIDIEN

## (undated) DEVICE — SALIVA EJECTOR (BLUE)

## (undated) DEVICE — BITE BLOCK ADULT 20 X 27MM (GREEN)